# Patient Record
Sex: FEMALE | Race: OTHER | HISPANIC OR LATINO | URBAN - METROPOLITAN AREA
[De-identification: names, ages, dates, MRNs, and addresses within clinical notes are randomized per-mention and may not be internally consistent; named-entity substitution may affect disease eponyms.]

---

## 2022-01-15 ENCOUNTER — EMERGENCY (EMERGENCY)
Facility: HOSPITAL | Age: 62
LOS: 1 days | Discharge: HOME | End: 2022-01-15
Admitting: EMERGENCY MEDICINE
Payer: COMMERCIAL

## 2022-01-15 VITALS
HEART RATE: 94 BPM | TEMPERATURE: 98 F | RESPIRATION RATE: 18 BRPM | OXYGEN SATURATION: 98 % | SYSTOLIC BLOOD PRESSURE: 133 MMHG | DIASTOLIC BLOOD PRESSURE: 84 MMHG

## 2022-01-15 DIAGNOSIS — S43.401A UNSPECIFIED SPRAIN OF RIGHT SHOULDER JOINT, INITIAL ENCOUNTER: ICD-10-CM

## 2022-01-15 DIAGNOSIS — Z90.49 ACQUIRED ABSENCE OF OTHER SPECIFIED PARTS OF DIGESTIVE TRACT: Chronic | ICD-10-CM

## 2022-01-15 DIAGNOSIS — Z96.659 PRESENCE OF UNSPECIFIED ARTIFICIAL KNEE JOINT: Chronic | ICD-10-CM

## 2022-01-15 DIAGNOSIS — M25.511 PAIN IN RIGHT SHOULDER: ICD-10-CM
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
## 2022-01-15 DIAGNOSIS — Z98.890 OTHER SPECIFIED POSTPROCEDURAL STATES: Chronic | ICD-10-CM

## 2022-01-15 DIAGNOSIS — S63.501A UNSPECIFIED SPRAIN OF RIGHT WRIST, INITIAL ENCOUNTER: ICD-10-CM
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
## 2022-01-15 PROCEDURE — 73030 X-RAY EXAM OF SHOULDER: CPT | Mod: 26,RT

## 2022-01-15 PROCEDURE — 73110 X-RAY EXAM OF WRIST: CPT | Mod: 26,RT

## 2022-01-15 PROCEDURE — 99284 EMERGENCY DEPT VISIT MOD MDM: CPT | Mod: 25

## 2022-01-15 RX ORDER — ACETAMINOPHEN 500 MG
975 TABLET ORAL ONCE
Refills: 0 | Status: COMPLETED | OUTPATIENT
Start: 2022-01-15 | End: 2022-01-15

## 2022-01-15 RX ORDER — ACETAMINOPHEN 500 MG
2 TABLET ORAL
Qty: 20 | Refills: 0
Start: 2022-01-15

## 2022-01-15 RX ADMIN — Medication 500 MILLIGRAM(S): at 01:45

## 2022-01-15 RX ADMIN — Medication 975 MILLIGRAM(S): at 01:45

## 2022-01-15 NOTE — ED ADULT TRIAGE NOTE - CHIEF COMPLAINT QUOTE
[place] with complaints of right shoulder pain s/p trip and fall. Denies head injury or LOC.

## 2022-01-15 NOTE — ED ADULT NURSE NOTE - NSIMPLEMENTINTERV_GEN_ALL_ED
Implemented All Fall Risk Interventions:  [place] to call system. Call bell, personal items and telephone within reach. Instruct patient to call for assistance. Room bathroom lighting operational. Non-slip footwear when patient is off stretcher. Physically safe environment: no spills, clutter or unnecessary equipment. Stretcher in lowest position, wheels locked, appropriate side rails in place. Provide visual cue, wrist band, yellow gown, etc. Monitor gait and stability. Monitor for mental status changes and reorient to person, place, and time. Review medications for side effects contributing to fall risk. Reinforce activity limits and safety measures with patient and family.

## 2022-01-15 NOTE — ED PROVIDER NOTE - OBJECTIVE STATEMENT
62 yo F with PMHx of rotator cuff injury s/p arthroscopic repair, RHD, presenting c/o shoulder pain s/p fall tonight. Pt's foot got caught in a plastic bag and fell forward.  Landed on her shoulder and now with pain and restricted ROM.  Denies prodromes, LOC, bleeding, HA, dizziness, SOB, CP, palpitations, N/V, change in sensation, abdominal/back/neck pain, focal weakness, change in vision/mental status/speech, paresthesia, and malaise. Pt is ambulatory s/p fall. 62 yo F with PMHx of asthma, no h/o intubation, R shoulder surgery x 4 times in the past, arthritis, s/p b/l TKR, RHD, presenting c/o shoulder pain s/p fall tonight. Pt's foot got caught in a plastic bag and fell forward.  Landed on her shoulder and broke her fall with R wrist, now with pain and restricted ROM. Pain is rated 5/10 with radiation to the R forearm and humerus.  Denies prodromes, LOC, bleeding, HA, dizziness, SOB, CP, palpitations, N/V, change in sensation, abdominal/back/neck pain, focal weakness, change in vision/mental status/speech, paresthesia, and malaise. Pt is ambulatory s/p fall.

## 2022-01-15 NOTE — ED PROVIDER NOTE - CARE PLAN
Principal Discharge DX:	Shoulder sprain  Secondary Diagnosis:	Right wrist sprain   1 Principal Discharge DX:	Shoulder sprain  Secondary Diagnosis:	Right wrist sprain  Secondary Diagnosis:	Fall

## 2022-01-15 NOTE — ED PROVIDER NOTE - PHYSICAL EXAMINATION
Gen - WDWN, NAD, comfortable and non-toxic appearing  Skin - warm, dry, intact   HEENT - AT/NC, airway patent, neck supple   CV - S1S2, R/R/R  Resp - CTAB, no r/r/w  GI - soft, ND, NT, no CVAT b/l   MS - w/w/p,   Neuro - AxOx3, ambulatory without gait disturbance Gen - WDWN, NAD, comfortable and non-toxic appearing  Skin - warm, dry, intact   HEENT - AT/NC, airway patent, neck supple   CV - S1S2, R/R/R  Resp - CTAB, no r/r/w  GI - soft, ND, NT, no CVAT b/l   MS - w/w/p, R shoulder and ulnar aspect of the R dorsum wrist with TTP but no focal edema, erythema, ecchymosis, crepitus, joint laxity, or deformity, restricted ROM 2/2 pain, NV intact. negative snuff box tenderness, +SILT, symmetric palpable distal pulses b/l, compartment soft   Neuro - AxOx3, ambulatory without gait disturbance

## 2022-01-15 NOTE — ED ADULT NURSE NOTE - OBJECTIVE STATEMENT
Pt presents to ed reporting right wrist pain and shoulder pain s/p mechanical fall tonight. no loc, no headstrike

## 2022-01-15 NOTE — ED PROVIDER NOTE - NSICDXPASTSURGICALHX_GEN_ALL_CORE_FT
PAST SURGICAL HISTORY:  History of cholecystectomy     S/P knee replacement     S/P shoulder surgery

## 2022-01-15 NOTE — ED PROVIDER NOTE - HIV OFFER
Patient's symptoms are most consistent with lateral epicondylitis  Patient was instructed to apply cold compress such as ice pack to the affected area for 20 minutes at a time  Do not apply ice directly to skin  Wear a cock up wrist splint every night for up to 4 weeks, and a tennis elbow brace during the daytime  Take Tylenol for pain relief as needed  Follow up in 4 week if symptoms do not improve with treatment  Consider referral to Orthopedic Surgery at that time 
Opt out

## 2022-01-15 NOTE — ED PROVIDER NOTE - PATIENT PORTAL LINK FT
You can access the FollowMyHealth Patient Portal offered by [facility] by registering at the following website: [email or web address]. By joining [facility]’s FollowMyHealth portal, you will also be able to view your health information using other applications (apps) compatible with our system.

## 2022-01-15 NOTE — ED PROVIDER NOTE - CARE PROVIDER_API CALL
Hill Finnegan)  Orthopaedic Surgery  [address] [place] [address], [address]  [place] [address]  Phone: [phone]  Fax: [phone]  Follow Up Time:

## 2022-01-15 NOTE — ED PROVIDER NOTE - CLINICAL SUMMARY MEDICAL DECISION MAKING FREE TEXT BOX
pt with mechanical fall, no associated prodromes, no focal neuro deficits, NV intact, Xray negative for acute fx or bony injury, velcro wrist splint and arm sling provided PRN support, encouraged RICE to affected region, weight bear as tolerated, f/u ortho, pt verbalized understanding.